# Patient Record
Sex: MALE | Race: WHITE
[De-identification: names, ages, dates, MRNs, and addresses within clinical notes are randomized per-mention and may not be internally consistent; named-entity substitution may affect disease eponyms.]

---

## 2018-03-21 ENCOUNTER — HOSPITAL ENCOUNTER (EMERGENCY)
Dept: HOSPITAL 74 - JERFT | Age: 38
Discharge: HOME | End: 2018-03-21
Payer: COMMERCIAL

## 2018-03-21 VITALS — SYSTOLIC BLOOD PRESSURE: 131 MMHG | TEMPERATURE: 98.7 F | DIASTOLIC BLOOD PRESSURE: 85 MMHG | HEART RATE: 86 BPM

## 2018-03-21 VITALS — BODY MASS INDEX: 23.4 KG/M2

## 2018-03-21 DIAGNOSIS — R35.0: Primary | ICD-10-CM

## 2018-03-21 LAB
APPEARANCE UR: CLEAR
BILIRUB UR STRIP.AUTO-MCNC: NEGATIVE MG/DL
COLOR UR: COLORLESS
KETONES UR QL STRIP: NEGATIVE
LEUKOCYTE ESTERASE UR QL STRIP.AUTO: NEGATIVE
NITRITE UR QL STRIP: NEGATIVE
PH UR: 7 [PH] (ref 5–8)
PROT UR QL STRIP: NEGATIVE
PROT UR QL STRIP: NEGATIVE
RBC # UR STRIP: NEGATIVE /UL
SP GR UR: 1 (ref 1–1.03)
UROBILINOGEN UR STRIP-MCNC: NEGATIVE MG/DL (ref 0.2–1)

## 2018-03-21 NOTE — PDOC
Rapid Medical Evaluation


Time Seen by Provider: 03/21/18 18:05


Medical Evaluation: 


 Allergies











Allergy/AdvReac Type Severity Reaction Status Date / Time


 


No Known Allergies Allergy   Verified 03/21/18 18:01











03/21/18 18:05


The patient presents with a chief complaint of: urinary frequency


 I have performed a brief in-person evaluation of this patient.


 Pertinent physical exam findings: vss


 I have ordered the following:  ua, needs fs


 The patient will proceed to the ED for further evaluation.

## 2020-01-30 ENCOUNTER — HOSPITAL ENCOUNTER (EMERGENCY)
Dept: HOSPITAL 74 - JER | Age: 40
LOS: 1 days | Discharge: HOME | End: 2020-01-31
Payer: COMMERCIAL

## 2020-01-30 VITALS — BODY MASS INDEX: 24.8 KG/M2

## 2020-01-30 DIAGNOSIS — R35.0: Primary | ICD-10-CM

## 2020-01-30 PROCEDURE — 3E0337Z INTRODUCTION OF ELECTROLYTIC AND WATER BALANCE SUBSTANCE INTO PERIPHERAL VEIN, PERCUTANEOUS APPROACH: ICD-10-PCS | Performed by: EMERGENCY MEDICINE

## 2020-01-31 VITALS — TEMPERATURE: 98.1 F | HEART RATE: 60 BPM | DIASTOLIC BLOOD PRESSURE: 73 MMHG | SYSTOLIC BLOOD PRESSURE: 118 MMHG

## 2020-01-31 LAB
ALBUMIN SERPL-MCNC: 3.9 G/DL (ref 3.4–5)
ALP SERPL-CCNC: 50 U/L (ref 45–117)
ALT SERPL-CCNC: 28 U/L (ref 13–61)
ANION GAP SERPL CALC-SCNC: 4 MMOL/L (ref 8–16)
APPEARANCE UR: CLEAR
AST SERPL-CCNC: 19 U/L (ref 15–37)
BASOPHILS # BLD: 0.5 % (ref 0–2)
BILIRUB SERPL-MCNC: 0.5 MG/DL (ref 0.2–1)
BILIRUB UR STRIP.AUTO-MCNC: NEGATIVE MG/DL
BUN SERPL-MCNC: 17.3 MG/DL (ref 7–18)
CALCIUM SERPL-MCNC: 8.7 MG/DL (ref 8.5–10.1)
CHLORIDE SERPL-SCNC: 107 MMOL/L (ref 98–107)
CO2 SERPL-SCNC: 32 MMOL/L (ref 21–32)
COLOR UR: YELLOW
CREAT SERPL-MCNC: 1.2 MG/DL (ref 0.55–1.3)
DEPRECATED RDW RBC AUTO: 12.7 % (ref 11.9–15.9)
EOSINOPHIL # BLD: 1.9 % (ref 0–4.5)
GLUCOSE SERPL-MCNC: 99 MG/DL (ref 74–106)
HCT VFR BLD CALC: 41.9 % (ref 35.4–49)
HGB BLD-MCNC: 14.3 GM/DL (ref 11.7–16.9)
KETONES UR QL STRIP: NEGATIVE
LEUKOCYTE ESTERASE UR QL STRIP.AUTO: NEGATIVE
LYMPHOCYTES # BLD: 33.9 % (ref 8–40)
MCH RBC QN AUTO: 32.2 PG (ref 25.7–33.7)
MCHC RBC AUTO-ENTMCNC: 34.1 G/DL (ref 32–35.9)
MCV RBC: 94.4 FL (ref 80–96)
MONOCYTES # BLD AUTO: 10.5 % (ref 3.8–10.2)
NEUTROPHILS # BLD: 53.2 % (ref 42.8–82.8)
NITRITE UR QL STRIP: NEGATIVE
PH UR: 6.5 [PH] (ref 5–8)
PLATELET # BLD AUTO: 221 K/MM3 (ref 134–434)
PMV BLD: 7.9 FL (ref 7.5–11.1)
POTASSIUM SERPLBLD-SCNC: 3.9 MMOL/L (ref 3.5–5.1)
PROT SERPL-MCNC: 6.6 G/DL (ref 6.4–8.2)
PROT UR QL STRIP: NEGATIVE
PROT UR QL STRIP: NEGATIVE
RBC # BLD AUTO: 4.44 M/MM3 (ref 4–5.6)
SODIUM SERPL-SCNC: 143 MMOL/L (ref 136–145)
SP GR UR: 1 (ref 1.01–1.03)
UROBILINOGEN UR STRIP-MCNC: 0.2 MG/DL (ref 0.2–1)
WBC # BLD AUTO: 7.3 K/MM3 (ref 4–10)

## 2020-01-31 NOTE — PDOC
Attending Attestation





- Resident


Resident Name: JuliGaby





- ED Attending Attestation


I have performed the following: I have examined & evaluated the patient, The 

case was reviewed & discussed with the resident, I agree w/resident's findings 

& plan, Exceptions are as noted





- HPI


HPI: 





01/31/20 01:24


39 M with no PMH presents to ED with urinary frequency and urgency. Pt 

estimates urinating about 40 times yesterday. He denies any fevers/chills. 

Endorses some suprapubic discomfort. Denies flank pain. Denies abdominal pain/N/

V. Had a few episodes of loose stool. Denies excessive thirst.


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 

improvement in his symptoms.





- Physicial Exam


PE: 





01/31/20 01:26


"GENERAL: Awake, alert, and fully oriented, in no acute distress.


HEAD: No signs of trauma


EYES: PERRLA, EOMI, sclera anicteric, conjunctiva clear


ENT: Auricles normal inspection, hearing grossly normal, nares patent, 

oropharynx clear without exudates. Moist mucosa


NECK: Nontender, no stepoffs, Normal ROM, supple, no lymphadenopathy, JVD, or 

masses


LUNGS: Breath sounds equal, clear to auscultation bilaterally.  No wheezes, and 

no crackles


HEART: Regular rate and rhythm, normal S1 and S2, no murmurs, rubs or gallops


ABDOMEN: Soft, nontender, normoactive bowel sounds.  No guarding, no rebound.  

No masses


EXTREMITIES: Normal range of motion, no edema.  No clubbing or cyanosis. No 

cords, erythema, or tenderness


NEUROLOGICAL: Cranial nerves II through XII intact. 5/5 strength and sensation 

in all extremities, Normal speech, normal gait, normal cerebellar function


SKIN: Warm, Dry, normal turgor, no rashes or lesions noted.





- Medical Decision Making





01/31/20 01:26


39 M with urinary freq and urgency. Suspicious for UTI. Pt also offered STD 

testing but no clinical signs of STD.


- Labs


- UA, UCx





01/31/20 01:27


Labs completely normal


UA clean, but given partial treatment with cipro, UA may not reflect a UTI


Will start pt on course of keflex


DC with urology f/u


Pt is well appearing, with normal vitals. Clinically stable for DC at this time.


I discussed the physical exam findings, ancillary test results and final 

diagnoses with the patient. I answered all of the patient's questions. The 

patient was satisfied with the care received and felt comfortable with the 

discharge plan and treatment plan.  The patient agrees to follow up with the 

primary care physician within 24-72 hours.

## 2020-01-31 NOTE — PDOC
History of Present Illness





- General


Chief Complaint: Urinary Problem


Stated Complaint: URANAL PROBLEM


Time Seen by Provider: 01/30/20 23:28





Past History





- Past Medical History


Allergies/Adverse Reactions: 


 Allergies











Allergy/AdvReac Type Severity Reaction Status Date / Time


 


No Known Allergies Allergy   Verified 01/30/20 22:29











Home Medications: 


Ambulatory Orders





No Home Medications 0 mg PO DAILY 06/05/14 








COPD: No





- Psycho Social/Smoking Cessation Hx


Smoking History: Never smoked


Have you smoked in the past 12 months: No


Hx Alcohol Use: No


Drug/Substance Use Hx: No





*Physical Exam





- Vital Signs


 Last Vital Signs











Temp Pulse Resp BP Pulse Ox


 


 98.4 F   65   16   139/77   100 


 


 01/30/20 22:29  01/30/20 22:29  01/30/20 22:29  01/30/20 22:29  01/30/20 22:29














ED Treatment Course





- LABORATORY


CBC & Chemistry Diagram: 


 01/30/20 00:30





 01/30/20 00:30





Discharge





- Follow up/Referral


Referrals: 


ON STAFF,NOT [Primary Care Provider] - 


Lorenzo Elliott MD [Staff Physician] - 


Chong Patel MD [Staff Physician] - 


Noel Amador MD [Staff Physician] - 


Deysi Zaldivar MD [Staff Physician] - 


Austin Taylor MD., MD [Staff Physician] - 


CallBack Reminder: 


GC results





- Patient Discharge Instructions





- Post Discharge Activity

## 2020-01-31 NOTE — PDOC
History of Present Illness





- General


Chief Complaint: Urinary Problem


Stated Complaint: URANAL PROBLEM


Time Seen by Provider: 01/30/20 23:28


History Source: Patient


Exam Limitations: No Limitations





- History of Present Illness


Initial Comments: 





01/31/20 01:50


39y M with no significant PMH presenting to ED with complaints of urinary 

frequency and urgency. Symptoms started 2d ago. Pt states that he drinks plenty 

of water and says that he attributed frequency to drinking water however pt 

states that today he had urgency with little urine output and pain in the 

suprapubic area as well as bilateral flanks. Pt states that he also has 

diarrhea. He has not had symptoms like this before. Denies dysuria, hematuria, n

/v, fevers, chills, chest pain, sob, rectal pain, discharge, ulcers, warts, 

lesions, testicular pain. Denies history of STDs. Pt works at a pharmacy and 

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 





PMD: 


PMH: none


PSH: none


Allergies: nkda


Social: 





Past History





- Past Medical History


Allergies/Adverse Reactions: 


 Allergies











Allergy/AdvReac Type Severity Reaction Status Date / Time


 


No Known Allergies Allergy   Verified 01/30/20 22:29











Home Medications: 


Ambulatory Orders





No Home Medications 0 mg PO DAILY 06/05/14 


Cephalexin Monohydrate [Keflex -] 500 mg PO Q6H #32 capsule 01/31/20 








COPD: No





- Psycho Social/Smoking Cessation Hx


Smoking History: Never smoked


Have you smoked in the past 12 months: No


Hx Alcohol Use: No


Drug/Substance Use Hx: No





**Review of Systems





- Review of Systems


Constitutional: No: Symptoms Reported


HEENTM: No: Symptoms Reported


Respiratory: No: Symptoms reported


Cardiac (ROS): No: Symptoms Reported


ABD/GI: Yes: See HPI


: Yes: See HPI


Musculoskeletal: Yes: See HPI


Integumentary: No: See HPI


Neurological: No: See HPI





*Physical Exam





- Vital Signs


 Last Vital Signs











Temp Pulse Resp BP Pulse Ox


 


 98.4 F   65   16   139/77   100 


 


 01/30/20 22:29  01/30/20 22:29  01/30/20 22:29  01/30/20 22:29  01/30/20 22:29














- Physical Exam


General Appearance: Yes: Nourished, Appropriately Dressed.  No: Apparent 

Distress


HEENT: positive: EOMI, ROJAS, Normal ENT Inspection


Neck: positive: Trachea midline, Supple


Respiratory/Chest: positive: Lungs Clear, Normal Breath Sounds


Cardiovascular: positive: Regular Rhythm, Regular Rate, S1, S2.  negative: Edema

, JVD, Murmur


Gastrointestinal/Abdominal: positive: Normal Bowel Sounds, Soft.  negative: 

Tender


Male Genitalia: positive: normal genitalia.  negative: discharge, testicular 

tenderness, testicular mass, epididymus tender, hernia


Musculoskeletal: negative: CVA Tenderness


Extremity: positive: Normal Capillary Refill.  negative: Swelling, Calf 

Tenderness


Integumentary: positive: Normal Color, Dry, Warm


Neurologic: positive: CNs II-XII NML intact, Fully Oriented, Alert, Normal Mood/

Affect, Normal Response, Motor Strength 5/5





ED Treatment Course





- LABORATORY


CBC & Chemistry Diagram: 


 01/30/20 00:30





 01/30/20 00:30





- ADDITIONAL ORDERS


Additional order review: 


 Laboratory  Results











  01/30/20 01/30/20





  00:30 00:30


 


Sodium  143 


 


Potassium  3.9 


 


Chloride  107 


 


Carbon Dioxide  32 


 


Anion Gap  4 L 


 


BUN  17.3 


 


Creatinine  1.2 


 


Est GFR (CKD-EPI)AfAm  89.00 


 


Est GFR (CKD-EPI)NonAf  76.79 


 


Random Glucose  99 


 


Calcium  8.7 


 


Total Bilirubin  0.5 


 


AST  19 


 


ALT  28 


 


Alkaline Phosphatase  50 


 


Total Protein  6.6 


 


Albumin  3.9 


 


Urine Color   Yellow


 


Urine Appearance   Clear


 


Urine pH   6.5


 


Ur Specific Gravity   1.005 L


 


Urine Protein   Negative


 


Urine Glucose (UA)   Negative


 


Urine Ketones   Negative


 


Urine Blood   Negative


 


Urine Nitrite   Negative


 


Urine Bilirubin   Negative


 


Urine Urobilinogen   0.2


 


Ur Leukocyte Esterase   Negative








 











  01/30/20





  00:30


 


RBC  4.44


 


MCV  94.4


 


MCHC  34.1


 


RDW  12.7


 


MPV  7.9


 


Neutrophils %  53.2


 


Lymphocytes %  33.9  D


 


Monocytes %  10.5 H


 


Eosinophils %  1.9  D


 


Basophils %  0.5














- Medications


Given in the ED: 


ED Medications














Discontinued Medications














Generic Name Dose Route Start Last Admin





  Trade Name Freq  PRN Reason Stop Dose Admin


 


Sodium Chloride  1,000 mls @ 1,000 mls/hr  01/31/20 00:09  01/31/20 00:53





  Normal Saline -  IV  01/31/20 01:08  1,000 mls/hr





  ASDIR STA   Administration





     





     





     





     














Medical Decision Making





- Medical Decision Making





01/31/20 01:53


pt presenting with dysuria. took cipro already. 


vitals wnl


pe: benign





ddx includes uti, gc, diabtetes, bph, urinary retention, pyelonephritis, 

electrolyte abnormality, stone





will order basic labs, ua. with ucx. 


fluids. 





10m post void bladder volume 45ml; no retention. 








labs wnl. ua negative for infection however this could be due to use of cipro. 

given ongoing symptoms, will treat with keflex. 


offered pt testing for gc; pt is in a monogamous relationship, no history of 

stds or ivdu. will get call back for positive results. 





dc home, uro f/u given. 





Discharge





- Discharge Information


Problems reviewed: Yes


Clinical Impression/Diagnosis: 


 Urinary frequency





Condition: Good


Disposition: HOME





- Admission


No





- Additional Discharge Information


Prescriptions: 


Cephalexin Monohydrate [Keflex -] 500 mg PO Q6H #32 capsule





- Follow up/Referral


Referrals: 


Chong Patel MD [Staff Physician] - 


Noel Amador MD [Staff Physician] - 


Lorenzo Elliott MD [Staff Physician] - 


Austin Taylor MD., MD [Staff Physician] - 


ON STAFF,NOT [Primary Care Provider] - 


Deysi Zaldivar MD [Staff Physician] - 


CallBack Reminder: 


GC results





- Patient Discharge Instructions


Patient Printed Discharge Instructions:  DI for Urinary Tract Infection (UTI)


Additional Instructions: 


You were seen in the emergency room today for urinary symptoms. The blood work 

is normal and the urine is not infected, but this could be due to the use of 

Cipro. 





A new prescription for Keflex was sent to your pharmacy (500mg four times per 

day for 7 days). 


I also recommend follow up with urology. Referrals are provided below. 





Come back to the emergency room if you notice blood in the urine, are unable to 

produce urine, you develop fever, rectal pain or if any new or concerning 

symptom develops. 





Thank you





- Post Discharge Activity

## 2021-03-15 ENCOUNTER — HOSPITAL ENCOUNTER (EMERGENCY)
Dept: HOSPITAL 74 - JER | Age: 41
Discharge: HOME | End: 2021-03-15
Payer: COMMERCIAL

## 2021-03-15 VITALS — SYSTOLIC BLOOD PRESSURE: 127 MMHG | DIASTOLIC BLOOD PRESSURE: 78 MMHG | HEART RATE: 86 BPM

## 2021-03-15 VITALS — BODY MASS INDEX: 23.2 KG/M2

## 2021-03-15 VITALS — TEMPERATURE: 98.4 F

## 2021-03-15 DIAGNOSIS — K40.90: Primary | ICD-10-CM

## 2021-03-15 LAB
APPEARANCE UR: CLEAR
BILIRUB UR STRIP.AUTO-MCNC: NEGATIVE MG/DL
COLOR UR: YELLOW
KETONES UR QL STRIP: (no result)
LEUKOCYTE ESTERASE UR QL STRIP.AUTO: NEGATIVE
NITRITE UR QL STRIP: NEGATIVE
PH UR: 5 [PH] (ref 5–8)
PROT UR QL STRIP: NEGATIVE
PROT UR QL STRIP: NEGATIVE
SP GR UR: 1.02 (ref 1.01–1.03)
UROBILINOGEN UR STRIP-MCNC: 0.2 MG/DL (ref 0.2–1)

## 2021-03-15 PROCEDURE — 3E0233Z INTRODUCTION OF ANTI-INFLAMMATORY INTO MUSCLE, PERCUTANEOUS APPROACH: ICD-10-PCS
